# Patient Record
Sex: FEMALE | Race: ASIAN | NOT HISPANIC OR LATINO | URBAN - METROPOLITAN AREA
[De-identification: names, ages, dates, MRNs, and addresses within clinical notes are randomized per-mention and may not be internally consistent; named-entity substitution may affect disease eponyms.]

---

## 2024-04-23 ENCOUNTER — EMERGENCY (EMERGENCY)
Facility: HOSPITAL | Age: 69
LOS: 1 days | Discharge: HOME | End: 2024-04-23
Attending: EMERGENCY MEDICINE
Payer: COMMERCIAL

## 2024-04-23 VITALS
HEIGHT: 59.06 IN | DIASTOLIC BLOOD PRESSURE: 71 MMHG | SYSTOLIC BLOOD PRESSURE: 104 MMHG | RESPIRATION RATE: 20 BRPM | OXYGEN SATURATION: 99 % | WEIGHT: 119.93 LBS | TEMPERATURE: 98 F | HEART RATE: 74 BPM

## 2024-04-23 VITALS
HEART RATE: 67 BPM | RESPIRATION RATE: 18 BRPM | OXYGEN SATURATION: 97 % | TEMPERATURE: 98 F | DIASTOLIC BLOOD PRESSURE: 71 MMHG | SYSTOLIC BLOOD PRESSURE: 120 MMHG

## 2024-04-23 LAB
ALBUMIN SERPL ELPH-MCNC: 4.1 G/DL — SIGNIFICANT CHANGE UP (ref 3.3–5)
ALP SERPL-CCNC: 52 U/L — SIGNIFICANT CHANGE UP (ref 40–120)
ALT FLD-CCNC: 18 U/L — SIGNIFICANT CHANGE UP (ref 10–45)
ANION GAP SERPL CALC-SCNC: 9 MMOL/L — SIGNIFICANT CHANGE UP (ref 5–17)
ANISOCYTOSIS BLD QL: SLIGHT — SIGNIFICANT CHANGE UP
AST SERPL-CCNC: 28 U/L — SIGNIFICANT CHANGE UP (ref 10–40)
BASE EXCESS BLDV CALC-SCNC: 0.5 MMOL/L — SIGNIFICANT CHANGE UP (ref -2–3)
BASOPHILS # BLD AUTO: 0.06 K/UL — SIGNIFICANT CHANGE UP (ref 0–0.2)
BASOPHILS NFR BLD AUTO: 1.7 % — SIGNIFICANT CHANGE UP (ref 0–2)
BILIRUB SERPL-MCNC: 0.2 MG/DL — SIGNIFICANT CHANGE UP (ref 0.2–1.2)
BUN SERPL-MCNC: 11 MG/DL — SIGNIFICANT CHANGE UP (ref 7–23)
CA-I SERPL-SCNC: 1.23 MMOL/L — SIGNIFICANT CHANGE UP (ref 1.15–1.33)
CALCIUM SERPL-MCNC: 9.3 MG/DL — SIGNIFICANT CHANGE UP (ref 8.4–10.5)
CHLORIDE BLDV-SCNC: 104 MMOL/L — SIGNIFICANT CHANGE UP (ref 96–108)
CHLORIDE SERPL-SCNC: 105 MMOL/L — SIGNIFICANT CHANGE UP (ref 96–108)
CO2 BLDV-SCNC: 29 MMOL/L — HIGH (ref 22–26)
CO2 SERPL-SCNC: 26 MMOL/L — SIGNIFICANT CHANGE UP (ref 22–31)
CREAT SERPL-MCNC: 0.8 MG/DL — SIGNIFICANT CHANGE UP (ref 0.5–1.3)
DACRYOCYTES BLD QL SMEAR: SLIGHT — SIGNIFICANT CHANGE UP
EGFR: 80 ML/MIN/1.73M2 — SIGNIFICANT CHANGE UP
EOSINOPHIL # BLD AUTO: 0.03 K/UL — SIGNIFICANT CHANGE UP (ref 0–0.5)
EOSINOPHIL NFR BLD AUTO: 0.9 % — SIGNIFICANT CHANGE UP (ref 0–6)
GAS PNL BLDV: 137 MMOL/L — SIGNIFICANT CHANGE UP (ref 136–145)
GAS PNL BLDV: SIGNIFICANT CHANGE UP
GAS PNL BLDV: SIGNIFICANT CHANGE UP
GLUCOSE BLDV-MCNC: 108 MG/DL — HIGH (ref 70–99)
GLUCOSE SERPL-MCNC: 128 MG/DL — HIGH (ref 70–99)
HCO3 BLDV-SCNC: 27 MMOL/L — SIGNIFICANT CHANGE UP (ref 22–29)
HCT VFR BLD CALC: 37.2 % — SIGNIFICANT CHANGE UP (ref 34.5–45)
HCT VFR BLDA CALC: 34 % — LOW (ref 34.5–46.5)
HGB BLD CALC-MCNC: 11.2 G/DL — LOW (ref 11.7–16.1)
HGB BLD-MCNC: 11.6 G/DL — SIGNIFICANT CHANGE UP (ref 11.5–15.5)
LACTATE BLDV-MCNC: 1.3 MMOL/L — SIGNIFICANT CHANGE UP (ref 0.5–2)
LYMPHOCYTES # BLD AUTO: 1.27 K/UL — SIGNIFICANT CHANGE UP (ref 1–3.3)
LYMPHOCYTES # BLD AUTO: 38.6 % — SIGNIFICANT CHANGE UP (ref 13–44)
MACROCYTES BLD QL: SLIGHT — SIGNIFICANT CHANGE UP
MAGNESIUM SERPL-MCNC: 2.2 MG/DL — SIGNIFICANT CHANGE UP (ref 1.6–2.6)
MANUAL SMEAR VERIFICATION: SIGNIFICANT CHANGE UP
MCHC RBC-ENTMCNC: 28.6 PG — SIGNIFICANT CHANGE UP (ref 27–34)
MCHC RBC-ENTMCNC: 31.2 GM/DL — LOW (ref 32–36)
MCV RBC AUTO: 91.6 FL — SIGNIFICANT CHANGE UP (ref 80–100)
MONOCYTES # BLD AUTO: 0.29 K/UL — SIGNIFICANT CHANGE UP (ref 0–0.9)
MONOCYTES NFR BLD AUTO: 8.8 % — SIGNIFICANT CHANGE UP (ref 2–14)
NEUTROPHILS # BLD AUTO: 1.62 K/UL — LOW (ref 1.8–7.4)
NEUTROPHILS NFR BLD AUTO: 49.1 % — SIGNIFICANT CHANGE UP (ref 43–77)
OVALOCYTES BLD QL SMEAR: SLIGHT — SIGNIFICANT CHANGE UP
PCO2 BLDV: 53 MMHG — HIGH (ref 39–42)
PH BLDV: 7.32 — SIGNIFICANT CHANGE UP (ref 7.32–7.43)
PLAT MORPH BLD: NORMAL — SIGNIFICANT CHANGE UP
PLATELET # BLD AUTO: 227 K/UL — SIGNIFICANT CHANGE UP (ref 150–400)
PO2 BLDV: 36 MMHG — SIGNIFICANT CHANGE UP (ref 25–45)
POIKILOCYTOSIS BLD QL AUTO: SLIGHT — SIGNIFICANT CHANGE UP
POTASSIUM BLDV-SCNC: 3.5 MMOL/L — SIGNIFICANT CHANGE UP (ref 3.5–5.1)
POTASSIUM SERPL-MCNC: 4.4 MMOL/L — SIGNIFICANT CHANGE UP (ref 3.5–5.3)
POTASSIUM SERPL-SCNC: 4.4 MMOL/L — SIGNIFICANT CHANGE UP (ref 3.5–5.3)
PROT SERPL-MCNC: 6.6 G/DL — SIGNIFICANT CHANGE UP (ref 6–8.3)
RBC # BLD: 4.06 M/UL — SIGNIFICANT CHANGE UP (ref 3.8–5.2)
RBC # FLD: 13.2 % — SIGNIFICANT CHANGE UP (ref 10.3–14.5)
RBC BLD AUTO: ABNORMAL
SAO2 % BLDV: 66 % — LOW (ref 67–88)
SODIUM SERPL-SCNC: 140 MMOL/L — SIGNIFICANT CHANGE UP (ref 135–145)
TROPONIN T, HIGH SENSITIVITY RESULT: 6 NG/L — SIGNIFICANT CHANGE UP (ref 0–51)
TROPONIN T, HIGH SENSITIVITY RESULT: 7 NG/L — SIGNIFICANT CHANGE UP (ref 0–51)
TSH SERPL-MCNC: 2.48 UIU/ML — SIGNIFICANT CHANGE UP (ref 0.27–4.2)
VARIANT LYMPHS # BLD: 0.9 % — SIGNIFICANT CHANGE UP (ref 0–6)
WBC # BLD: 3.3 K/UL — LOW (ref 3.8–10.5)
WBC # FLD AUTO: 3.3 K/UL — LOW (ref 3.8–10.5)

## 2024-04-23 PROCEDURE — 83735 ASSAY OF MAGNESIUM: CPT

## 2024-04-23 PROCEDURE — 80053 COMPREHEN METABOLIC PANEL: CPT

## 2024-04-23 PROCEDURE — 82435 ASSAY OF BLOOD CHLORIDE: CPT

## 2024-04-23 PROCEDURE — 85014 HEMATOCRIT: CPT

## 2024-04-23 PROCEDURE — 71046 X-RAY EXAM CHEST 2 VIEWS: CPT

## 2024-04-23 PROCEDURE — 84443 ASSAY THYROID STIM HORMONE: CPT

## 2024-04-23 PROCEDURE — 85025 COMPLETE CBC W/AUTO DIFF WBC: CPT

## 2024-04-23 PROCEDURE — 36415 COLL VENOUS BLD VENIPUNCTURE: CPT

## 2024-04-23 PROCEDURE — 84295 ASSAY OF SERUM SODIUM: CPT

## 2024-04-23 PROCEDURE — 84132 ASSAY OF SERUM POTASSIUM: CPT

## 2024-04-23 PROCEDURE — 99285 EMERGENCY DEPT VISIT HI MDM: CPT | Mod: 25

## 2024-04-23 PROCEDURE — 82330 ASSAY OF CALCIUM: CPT

## 2024-04-23 PROCEDURE — 99285 EMERGENCY DEPT VISIT HI MDM: CPT

## 2024-04-23 PROCEDURE — 82947 ASSAY GLUCOSE BLOOD QUANT: CPT

## 2024-04-23 PROCEDURE — 83605 ASSAY OF LACTIC ACID: CPT

## 2024-04-23 PROCEDURE — 71046 X-RAY EXAM CHEST 2 VIEWS: CPT | Mod: 26

## 2024-04-23 PROCEDURE — 93005 ELECTROCARDIOGRAM TRACING: CPT

## 2024-04-23 PROCEDURE — 84484 ASSAY OF TROPONIN QUANT: CPT

## 2024-04-23 PROCEDURE — 85018 HEMOGLOBIN: CPT

## 2024-04-23 PROCEDURE — 82803 BLOOD GASES ANY COMBINATION: CPT

## 2024-04-23 NOTE — ED PROVIDER NOTE - PROGRESS NOTE DETAILS
NATALIE Duran: discussed results with mandarin  [id number] and recommended pmd/ cardiology outpt f/u. patient has a cardiologist

## 2024-04-23 NOTE — ED ADULT NURSE NOTE - NSFALLUNIVINTERV_ED_ALL_ED
Bed/Stretcher in lowest position, wheels locked, appropriate side rails in place/Call bell, personal items and telephone in reach/Instruct patient to call for assistance before getting out of bed/chair/stretcher/Non-slip footwear applied when patient is off stretcher/[place] to call system/Physically safe environment - no spills, clutter or unnecessary equipment/Purposeful proactive rounding/Room/bathroom lighting operational, light cord in reach

## 2024-04-23 NOTE — ED ADULT TRIAGE NOTE - CHIEF COMPLAINT QUOTE
spasm and twitching at left face, abdomen, vagina and rectal area for 2-3 months worse in the past 2-3 days, as per Mandarin  Jeremias [id number]

## 2024-04-23 NOTE — ED PROVIDER NOTE - CLINICAL SUMMARY MEDICAL DECISION MAKING FREE TEXT BOX
69F hx of HTN here w c/o intermittent throbbing sensation to multiple areas of her body including chest, face, abdomen, vagina and rectum on going xweeks to months, reportedly worse lately. Reporst sx are worse when she is lying in her bed at night. Denies chest pain or pressure. No SOB. No leg swelling. No dizziness or syncope. No skin rashes. No HA, fevers or vision changes. Pt having difficulty describing her sx even with use of  services. VS non actionable. Pe WNL. No FND. Normal heart and lung sounds, abd soft nmtnd, ext wwp, no skin rash or lesions including to genital region. CN intact. Unclear what pt is trying to prescribe, possibly palpitations, possibly tremors, or fasciculations or spasms. Will check screening labs including electrolytes, cardiac enzymes, TSH, CXR, monitor on tele. Dispo pending results labs and imaging. Likely home w OP FU given vague nature of her symptoms and weeks to months of duration.

## 2024-04-23 NOTE — ED PROVIDER NOTE - CARDIAC, MLM
Normal rate, regular rhythm.  Heart sounds S1, S2.  No murmurs, rubs or gallops. Palpable pulses all 4 ext

## 2024-04-23 NOTE — ED PROVIDER NOTE - PATIENT PORTAL LINK FT
You can access the FollowMyHealth Patient Portal offered by [facility] by registering at the following website: [email or web address]. By joining [employer]’s FollowMyHealth portal, you will also be able to view your health information using other applications (apps) compatible with our system.

## 2024-04-23 NOTE — ED ADULT NURSE NOTE - OBJECTIVE STATEMENT
Pt is 69y F with PMH HTN, HLD, complaining of palpitations/throbbing in sternal, rib, rectum, and vagina area. Pt reports onset of symptoms 10/2023, reports seeing PCP regarding symptoms, but reported to [facility] for further evaluation. Denies numbness or tingling in all extremities, denies weakness. Full ROM of all extremities. Denies chest pain, SOB, abdominal pain, n/v. Denies changes in urination or BM. Vitals WNL.

## 2024-04-23 NOTE — ED PROVIDER NOTE - NSFOLLOWUPINSTRUCTIONS_ED_ALL_ED_FT
Please follow up with your primary care doctor in 1-3 days.  *Bring all printed lab/test results to your appointment(s).*    Stay well hydrated with water and electrolyte replacement solutions such as Pedialyte or the adult equivalent.    Return to the ED for chest pain, shortness of breath, dizziness, loss of consciousness, or any other concerns.

## 2024-04-23 NOTE — ED ADULT NURSE NOTE - CHIEF COMPLAINT QUOTE
spasm and twitching at left face, abdomen, vagina and rectal area for 2-3 months worse in the past 2-3 days, as per Mandarin  Jeremias [id number]

## 2024-04-23 NOTE — ED PROVIDER NOTE - OBJECTIVE STATEMENT
69y F pmhx HTN p/w >1 year of intermittent "throbbing/spasm" sensation in face, chest, abdomen, vagina, rectum. pt also reports recent eval for dizziness w/recent negative MRI, given meclizine. saw her PMD for this who thought it was due to lack of sleep. pt denies sx worse with exertion. reports throbbing feeling pointing to anterior chest and left side of face. denies back pain. denies chest pain/pressure. denies sx worse with exertion. denies peripheral numbness/tingling/weakness. denies trauma. denies fever, chills, HA, dizziness, vision changes, SOB, LOC, abd pain, NVC, urinary sx, vaginal bleeding/discharge, rectal bleeding    hussein Ann #[id number]

## 2024-08-12 ENCOUNTER — EMERGENCY (EMERGENCY)
Facility: HOSPITAL | Age: 69
LOS: 1 days | Discharge: HOME | End: 2024-08-12
Attending: EMERGENCY MEDICINE
Payer: MEDICARE

## 2024-08-12 VITALS
SYSTOLIC BLOOD PRESSURE: 145 MMHG | HEART RATE: 59 BPM | RESPIRATION RATE: 18 BRPM | DIASTOLIC BLOOD PRESSURE: 86 MMHG | OXYGEN SATURATION: 97 % | TEMPERATURE: 98 F

## 2024-08-12 VITALS
HEART RATE: 63 BPM | TEMPERATURE: 98 F | HEIGHT: 61.02 IN | RESPIRATION RATE: 16 BRPM | DIASTOLIC BLOOD PRESSURE: 89 MMHG | OXYGEN SATURATION: 99 % | WEIGHT: 119.93 LBS | SYSTOLIC BLOOD PRESSURE: 149 MMHG

## 2024-08-12 PROBLEM — Z78.9 OTHER SPECIFIED HEALTH STATUS: Chronic | Status: ACTIVE | Noted: 2024-04-23

## 2024-08-12 LAB
ALBUMIN SERPL ELPH-MCNC: 4.2 G/DL — SIGNIFICANT CHANGE UP (ref 3.3–5)
ALP SERPL-CCNC: 57 U/L — SIGNIFICANT CHANGE UP (ref 40–120)
ALT FLD-CCNC: 27 U/L — SIGNIFICANT CHANGE UP (ref 10–45)
ANION GAP SERPL CALC-SCNC: 11 MMOL/L — SIGNIFICANT CHANGE UP (ref 5–17)
AST SERPL-CCNC: 30 U/L — SIGNIFICANT CHANGE UP (ref 10–40)
BASOPHILS # BLD AUTO: 0.01 K/UL — SIGNIFICANT CHANGE UP (ref 0–0.2)
BASOPHILS NFR BLD AUTO: 0.2 % — SIGNIFICANT CHANGE UP (ref 0–2)
BILIRUB SERPL-MCNC: 0.4 MG/DL — SIGNIFICANT CHANGE UP (ref 0.2–1.2)
BUN SERPL-MCNC: 13 MG/DL — SIGNIFICANT CHANGE UP (ref 7–23)
CALCIUM SERPL-MCNC: 9.8 MG/DL — SIGNIFICANT CHANGE UP (ref 8.4–10.5)
CHLORIDE SERPL-SCNC: 108 MMOL/L — SIGNIFICANT CHANGE UP (ref 96–108)
CO2 SERPL-SCNC: 24 MMOL/L — SIGNIFICANT CHANGE UP (ref 22–31)
CREAT SERPL-MCNC: 0.87 MG/DL — SIGNIFICANT CHANGE UP (ref 0.5–1.3)
EGFR: 72 ML/MIN/1.73M2 — SIGNIFICANT CHANGE UP
EOSINOPHIL # BLD AUTO: 0.02 K/UL — SIGNIFICANT CHANGE UP (ref 0–0.5)
EOSINOPHIL NFR BLD AUTO: 0.4 % — SIGNIFICANT CHANGE UP (ref 0–6)
GLUCOSE SERPL-MCNC: 124 MG/DL — HIGH (ref 70–99)
HCT VFR BLD CALC: 37.4 % — SIGNIFICANT CHANGE UP (ref 34.5–45)
HGB BLD-MCNC: 11.9 G/DL — SIGNIFICANT CHANGE UP (ref 11.5–15.5)
IMM GRANULOCYTES NFR BLD AUTO: 0.4 % — SIGNIFICANT CHANGE UP (ref 0–0.9)
LYMPHOCYTES # BLD AUTO: 1.08 K/UL — SIGNIFICANT CHANGE UP (ref 1–3.3)
LYMPHOCYTES # BLD AUTO: 24.1 % — SIGNIFICANT CHANGE UP (ref 13–44)
MCHC RBC-ENTMCNC: 29.3 PG — SIGNIFICANT CHANGE UP (ref 27–34)
MCHC RBC-ENTMCNC: 31.8 GM/DL — LOW (ref 32–36)
MCV RBC AUTO: 92.1 FL — SIGNIFICANT CHANGE UP (ref 80–100)
MONOCYTES # BLD AUTO: 0.28 K/UL — SIGNIFICANT CHANGE UP (ref 0–0.9)
MONOCYTES NFR BLD AUTO: 6.2 % — SIGNIFICANT CHANGE UP (ref 2–14)
NEUTROPHILS # BLD AUTO: 3.08 K/UL — SIGNIFICANT CHANGE UP (ref 1.8–7.4)
NEUTROPHILS NFR BLD AUTO: 68.7 % — SIGNIFICANT CHANGE UP (ref 43–77)
NRBC # BLD: 0 /100 WBCS — SIGNIFICANT CHANGE UP (ref 0–0)
PLATELET # BLD AUTO: 241 K/UL — SIGNIFICANT CHANGE UP (ref 150–400)
POTASSIUM SERPL-MCNC: 4.3 MMOL/L — SIGNIFICANT CHANGE UP (ref 3.5–5.3)
POTASSIUM SERPL-SCNC: 4.3 MMOL/L — SIGNIFICANT CHANGE UP (ref 3.5–5.3)
PROT SERPL-MCNC: 7.2 G/DL — SIGNIFICANT CHANGE UP (ref 6–8.3)
RBC # BLD: 4.06 M/UL — SIGNIFICANT CHANGE UP (ref 3.8–5.2)
RBC # FLD: 13.2 % — SIGNIFICANT CHANGE UP (ref 10.3–14.5)
SODIUM SERPL-SCNC: 143 MMOL/L — SIGNIFICANT CHANGE UP (ref 135–145)
WBC # BLD: 4.49 K/UL — SIGNIFICANT CHANGE UP (ref 3.8–10.5)
WBC # FLD AUTO: 4.49 K/UL — SIGNIFICANT CHANGE UP (ref 3.8–10.5)

## 2024-08-12 PROCEDURE — 96374 THER/PROPH/DIAG INJ IV PUSH: CPT | Mod: XU

## 2024-08-12 PROCEDURE — 70487 CT MAXILLOFACIAL W/DYE: CPT | Mod: MC

## 2024-08-12 PROCEDURE — 99284 EMERGENCY DEPT VISIT MOD MDM: CPT | Mod: 25

## 2024-08-12 PROCEDURE — 99285 EMERGENCY DEPT VISIT HI MDM: CPT

## 2024-08-12 PROCEDURE — 80053 COMPREHEN METABOLIC PANEL: CPT

## 2024-08-12 PROCEDURE — 85025 COMPLETE CBC W/AUTO DIFF WBC: CPT

## 2024-08-12 PROCEDURE — T1013: CPT

## 2024-08-12 PROCEDURE — 70487 CT MAXILLOFACIAL W/DYE: CPT | Mod: 26,MC

## 2024-08-12 RX ORDER — CHLORHEXIDINE GLUCONATE 500 MG/1
15 CLOTH TOPICAL
Qty: 1 | Refills: 0
Start: 2024-08-12 | End: 2024-08-16

## 2024-08-12 RX ORDER — IBUPROFEN 200 MG
1 TABLET ORAL
Qty: 15 | Refills: 0
Start: 2024-08-12 | End: 2024-08-16

## 2024-08-12 RX ORDER — KETOROLAC TROMETHAMINE 10 MG
15 TABLET ORAL ONCE
Refills: 0 | Status: DISCONTINUED | OUTPATIENT
Start: 2024-08-12 | End: 2024-08-12

## 2024-08-12 RX ORDER — TRAMADOL HCL 50 MG
1 TABLET ORAL
Qty: 15 | Refills: 0
Start: 2024-08-12 | End: 2024-08-16

## 2024-08-12 RX ORDER — ACETAMINOPHEN 500 MG
975 TABLET ORAL ONCE
Refills: 0 | Status: COMPLETED | OUTPATIENT
Start: 2024-08-12 | End: 2024-08-12

## 2024-08-12 RX ADMIN — Medication 15 MILLIGRAM(S): at 09:40

## 2024-08-12 RX ADMIN — Medication 975 MILLIGRAM(S): at 09:32

## 2024-08-12 NOTE — ED PROVIDER NOTE - PROGRESS NOTE DETAILS
Marcial Jhaveri MD PGY3: Called patient's dentist Denae Garcia Max -[phone]. Reception answered call and discussed patient's presentation and complaints in the emergency department. Discussed need to discuss patient with dentist for potential post-procedure complication and complaints, I was informed that dentist Max nor any other dentist wanted to discuss this patient with me via the phone at this time despite the potential for these complications, citing that they had busy schedule today. Confirmed this x2. Said patient has appointment tomorrow if patient can come to that appointment for eval, but would be unable to offer further discussion or recommendations at this time. Marcial Jhaveri MD PGY3: Called patient's dentist Denae Garcia Max -[phone]. Reception answered call and discussed patient's presentation and complaints in the emergency department. Discussed need to discuss patient with dentist for potential post-procedure complication and complaints, I was informed that dentist Max nor any other dentist wanted to discuss this patient with me via the phone at this time despite the potential for these complications, citing that they had busy schedule today. Confirmed this x2. Said patient has appointment tomorrow if patient can come to that appointment for eval, but would be unable to offer further discussion or recommendations at this time. Paged house dental for discussion of further eval/abx. Marcial Jhaveir MD PGY3: dental recs cont augmentin, chlorhexadine rinses TID. no retained body or abscess on ct or dental xr. Discussed with patient all results including any incidentals. Discussed discharge, follow up, return precautions, medications. Patient verbalizes understanding and is amenable at this time. Answered patient questions.

## 2024-08-12 NOTE — PROGRESS NOTE ADULT - SUBJECTIVE AND OBJECTIVE BOX
mandarin  molina [phone] for duration of tx    Patient is a 69y old  Female who presents with a chief complaint of pain on lower right after extractions    HPI: Patient is a 69-year-old female past medical history hypertension, hyperlipidemia, unspecified kidney problem, presenting for right lower tooth pain.  Patient reports having dental extractions for brittle teeth on July 30 and on August 5.  Since then has had constant and not improving pain to the bottom right teeth where the extractions occurred, without worsening bleeding or any pus drainage from the site.  Has noticed mild swelling to the area as well.  No associated fevers.  Pain makes it difficult to eat food, was prescribed Tylenol and ibuprofen to manage pain which sometimes relieves the pain, last taken yesterday.  Additionally was prescribed Augmentin antibiotic with which she reports being compliant.  Otherwise has had no swelling to her neck, tongue, no difficulty breathing or tolerating her secretions.  No headaches or ear pain.  Most recently went to her dentist last Thursday for her pain but was not prescribed any new medications.    PAST MEDICAL & SURGICAL HISTORY:  hypertension, hyperlipidemia, unspecified kidney problem    No significant past surgical history      MEDICATIONS  (STANDING):  * Outpatient Medication Status not yet specified  - Augmentin as prescribed by pre tx dentist    MEDICATIONS  (PRN):  - Tylenol  - Ibuprofen    Allergies  - No Known Allergies    *Last Dental Visit: 08/08/2024 for pain f/u at treating dentist    EOE:  TMJ ( -  ) clicks                     ( -  ) pops                     ( -  ) crepitus             Mandible FROM             Facial bones and MOM grossly intact             ( -  ) trismus             ( -  ) lymphadenopathy             ( -  ) swelling             ( -  ) asymmetry             ( -  ) palpation             (  - ) dyspnea             ( -  ) dysphagia             (  - ) loss of consciousness    IOE:   permanent, multiple missing teeth. Exo sites #29 and #30 pink, gingvia, tan fibrinous granulation with approximation of both sockets. No bleeding, minor erythema, edematous, no purulence, no vestibular swelling, sites  to palpation           hard/soft palate:  ( -  ) palatal torus, No pathology noted           tongue/FOM No pathology noted           labial/buccal mucosa <<No pathology noted>>           (  - ) percussion           ( +  ) palpation           ( -  ) swelling            ( -  ) abscess           ( -  ) sinus tract    Dentition present: permanent  Mobility: did not evaluate  Caries: did not evaluate    *DENTAL RADIOGRAPHS: Posterior PAs  - Extraction sockets of #29 and #30 show no evidence of root tips or any other osseous pathology    *ASSESSMENT: #29 and #30 are healing moderately well, patient is still symptomatic due to probable surgical extraction of the sites. No obvious signs of pathology, no acute signs of infection.    *PLAN: monitor #29 and #30 exo sites.     PROCEDURE: EOE, IOE, radiographs  Verbal consent given.  Treatment: Examination, irrigation of surgical sites with saline. No gross debris noted.    Discussion: explained to patient that no acute signs of infection noted. Explained to patient to continue abx augmentin and use CHG rinse as prescribed along with salt water rinses and analgesics PRN. Pt understood. Pt will return to treat provider within the week for post op.     RECOMMENDATIONS:  1) Return to treating dentist for post-ops. Continue salt water rinses. Complete course of augmentin as prescribed. Use chlorhexidine rinse .12% TID, swish and spit, disp 473ml. OTC analgesics PRN  2) Dental F/U with outpatient dentist for comprehensive dental care.   3) If any difficulty swallowing/breathing, fever occur, return to ER.     Resident Name, pager # mandarin  molina [phone] for duration of tx    Patient is a 69y old  Female who presents with a chief complaint of pain on lower right after extractions    HPI: Patient is a 69-year-old female past medical history hypertension, hyperlipidemia, unspecified kidney problem, presenting for right lower tooth pain.  Patient reports having dental extractions for brittle teeth on July 30 and on August 5.  Since then has had constant and not improving pain to the bottom right teeth where the extractions occurred, without worsening bleeding or any pus drainage from the site.  Has noticed mild swelling to the area as well.  No associated fevers.  Pain makes it difficult to eat food, was prescribed Tylenol and ibuprofen to manage pain which sometimes relieves the pain, last taken yesterday.  Additionally was prescribed Augmentin antibiotic with which she reports being compliant.  Otherwise has had no swelling to her neck, tongue, no difficulty breathing or tolerating her secretions.  No headaches or ear pain.  Most recently went to her dentist last Thursday for her pain but was not prescribed any new medications.    PAST MEDICAL & SURGICAL HISTORY:  hypertension, hyperlipidemia, unspecified kidney problem    No significant past surgical history      MEDICATIONS  (STANDING):  * Outpatient Medication Status not yet specified  - Augmentin as prescribed by pre tx dentist    MEDICATIONS  (PRN):  - Tylenol  - Ibuprofen    Allergies  - No Known Allergies    *Last Dental Visit: 08/08/2024 for pain f/u at treating dentist    EOE:  TMJ ( -  ) clicks                     ( -  ) pops                     ( -  ) crepitus             Mandible FROM             Facial bones and MOM grossly intact             ( -  ) trismus             ( -  ) lymphadenopathy             ( -  ) swelling             ( -  ) asymmetry             ( -  ) palpation             (  - ) dyspnea             ( -  ) dysphagia             (  - ) loss of consciousness    IOE:   permanent, multiple missing teeth. Exo sites #29 and #30 pink, gingvia, tan fibrinous granulation with approximation of both sockets. No bleeding, minor erythema, edematous, no purulence, no vestibular swelling, sites  to palpation           hard/soft palate:  ( -  ) palatal torus, No pathology noted           tongue/FOM No pathology noted           labial/buccal mucosa <<No pathology noted>>           (  - ) percussion           ( +  ) palpation           ( -  ) swelling            ( -  ) abscess           ( -  ) sinus tract    Dentition present: permanent  Mobility: did not evaluate  Caries: did not evaluate    *DENTAL RADIOGRAPHS: Posterior PAs  - Extraction sockets of #29 and #30 show no evidence of root tips or any other osseous pathology    *ASSESSMENT: #29 and #30 are healing moderately well, patient is still symptomatic due to probable surgical extraction of the sites. No obvious signs of pathology, no acute signs of infection.    *PLAN: monitor #29 and #30 exo sites.     PROCEDURE: EOE, IOE, radiographs  Verbal consent given.  Treatment: Examination, irrigation of surgical sites with saline. No gross debris noted.    Discussion: explained to patient that no acute signs of infection noted. Explained to patient to continue abx augmentin and use CHG rinse as prescribed along with salt water rinses and analgesics PRN. Pt understood. Pt will return to treat provider within the week for post op.     RECOMMENDATIONS:  1) Return to treating dentist for post-ops. Continue salt water rinses. Complete course of augmentin as prescribed. Use chlorhexidine rinse .12% TID, swish and spit, disp 473ml. OTC analgesics PRN  2) Dental F/U with outpatient dentist for comprehensive dental care.   3) If any difficulty swallowing/breathing, fever occur, return to ER.     Bob Lujan DDS, pager #[phone]

## 2024-08-12 NOTE — ED PROVIDER NOTE - PHYSICAL EXAMINATION
CONSTITUTIONAL: awake, in no acute distress.   SKIN: no erythema or warmth to skin of face/cheek/mouth  HEAD: Normocephalic; atraumatic.  EYES: no conjunctival injection. no scleral icterus \  MOUTH: R Teeth 31-32 not present, tooth 30 appears present level with gum line and tender to tooth, swelling appreciated to lateral aspect of gum below teeth 30-31 areas with tenderness. No floor of mouth changes, oropharynx clear. No drainage/purulence.  NECK: Supple; non tender. No swelling, tenderness, or overlying skin changes.  CARD: S1, S2 normal; no murmurs, gallops, or rubs  RESP: No wheezes, rales or rhonchi. Good air movement bilaterally.   ABD: soft ntnd, no guarding, no distention, no rigidity.   EXT: Ambulates independently.  No cyanosis or edema.   NEURO: Alert, oriented, grossly unremarkable  PSYCH: Cooperative, appropriate.

## 2024-08-12 NOTE — ED PROVIDER NOTE - OBJECTIVE STATEMENT
Patient is a 69-year-old female past medical history hypertension, hyperlipidemia, unspecified kidney problem, presenting for right lower tooth pain.  Patient reports having dental extractions for brittle teeth on July 30 and on August 5.  Since then has had constant and not improving pain to the bottom right teeth where the extractions occurred, without worsening bleeding or any pus drainage from the site.  Has noticed mild swelling to the area as well.  No associated fevers.  Pain makes it difficult to eat food, was prescribed Tylenol and ibuprofen to manage pain which sometimes relieves the pain, last taken yesterday.  Additionally was prescribed Augmentin antibiotic with which she reports being compliant.  Otherwise has had no swelling to her neck, tongue, no difficulty breathing or tolerating her secretions.  No headaches or ear pain.  Most recently went to her dentist last Thursday for her pain but was not prescribed any new medications.

## 2024-08-12 NOTE — ED PROVIDER NOTE - NSFOLLOWUPINSTRUCTIONS_ED_ALL_ED_FT
Call your dentist for a follow up appointment.    If you are not able to make an appointment with your dentist,  call the one listed below.    Continue the AUGMENTIN antibiotics. Call your dentist for a follow up appointment - you should have one scheduled for tomorrow, please confirm this and keep this appointment.    If you are not able to make an appointment with your dentist,  call the one listed below.    Continue the AUGMENTIN antibiotics.    You may use Chlorhexadine rinses up to 3 times per day, swish and spit 15mL, these were sent to your pharmacy. Call your dentist for a follow up appointment - you should have one scheduled for tomorrow, please confirm this and keep this appointment.    If you are not able to make an appointment with your dentist,  call the one listed below.    Continue the AUGMENTIN antibiotics.    You may use Chlorhexadine rinses up to 3 times per day, swish and spit 15mL, these were sent to your pharmacy.    Take IBUPROFEN as needed for moderate pain.    Add TRAMADOL if the pain becomes more severe.

## 2024-08-12 NOTE — ED PROVIDER NOTE - NSFOLLOWUPCLINICS_GEN_ALL_ED_FT
[facility]  Dental  [address] [place], [place] [address]  Phone: [phone]  Fax:   Follow Up Time: 4-6 Days

## 2024-08-12 NOTE — ED PROVIDER NOTE - CLINICAL SUMMARY MEDICAL DECISION MAKING FREE TEXT BOX
Patient is a 69-year-old female past medical history hypertension, hyperlipidemia, unspecified kidney problem, presenting for right lower tooth pain. With initial vital signs within normal limits including afebrile.  Presenting with pain to teeth 30 and 31/32 following dental extraction x 2, with palpable swelling and tenderness to the lateral aspect of the gumline for the affected teeth without any obvious purulence or overlying skin changes to the mouth, and no floor of mouth changes or other neck changes.  Without any compromise to airway.  Reports appropriately taking prophylactic antibiotic.  Differential includes but not limited to postprocedural pain versus abscess versus retained body versus tooth infection.  To screen labs, evaluate CT, give analgesia and reassess.  Will likely require dental follow-up. Patient is a 69-year-old female past medical history hypertension, hyperlipidemia, unspecified kidney problem, presenting for right lower tooth pain. With initial vital signs within normal limits including afebrile.  Presenting with pain to teeth 30 and 31/32 following dental extraction x 2, with palpable swelling and tenderness to the lateral aspect of the gumline for the affected teeth without any obvious purulence or overlying skin changes to the mouth, and no floor of mouth changes or other neck changes.  Without any compromise to airway.  Reports appropriately taking prophylactic antibiotic.  Differential includes but not limited to postprocedural pain versus abscess versus retained body versus tooth infection.  To screen labs, evaluate CT, give analgesia and reassess.  Will likely require dental follow-up.    Interpretation services used for initial and subsequent interactions. 12 minutes.

## 2024-08-12 NOTE — ED PROVIDER NOTE - ATTENDING CONTRIBUTION TO CARE
69F hx htn, hld pw dental pain sp teeth extraction x3 in the past 2 weeks  on exam, extraction of right lower premolars and molars and right upper lateral incisor  yellowish appearance of sockets, ?pus  no swelling noted of the face   pt's dentist unwilling to discuss case with us dr sintai degroot of [place]   will dw dental consult here 69F hx htn, hld pw dental pain sp teeth extraction x3 in the past 2 weeks  on exam, extraction of right lower premolars and molars and right upper lateral incisor  yellowish appearance of sockets, ?pus  no swelling noted of the face   pt's dentist unwilling to discuss case with us dr sintia degroot of [place]   will dw dental consult here    mandarin  molina [id number] used for discharge instructions. pt understands to fu with dentist and to take abx completely and to prn pain meds

## 2024-08-12 NOTE — ED PROVIDER NOTE - PATIENT PORTAL LINK FT
You can access the FollowMyHealth Patient Portal offered by [facility] by registering at the following website: [email or web address]. By joining [facility] [employer]’s FollowMyHealth portal, you will also be able to view your health information using other applications (apps) compatible with our system.

## 2024-08-12 NOTE — ED ADULT NURSE NOTE - OBJECTIVE STATEMENT
Pt presents to ED from home. Pt complains of worsened dental pain in R lower tooth and facial swelling. As per pt she has a tooth removal on 7/30 and 8/5 and was started on Antibiotics- Augmentin and Tylenol. Pt denies bleeding, pus, fever, chills at home. Pt is Mandarin speaking,  used Efren Self #[id number]. Pt presents at baseline mental status, AAOx4. Plan of care and monitoring discussed with pt. Bed locked and lowered for safety. Safety and comfort maintained.
